# Patient Record
Sex: MALE | Race: WHITE | NOT HISPANIC OR LATINO | Employment: OTHER | URBAN - METROPOLITAN AREA
[De-identification: names, ages, dates, MRNs, and addresses within clinical notes are randomized per-mention and may not be internally consistent; named-entity substitution may affect disease eponyms.]

---

## 2020-01-27 ENCOUNTER — TELEPHONE (OUTPATIENT)
Dept: GASTROENTEROLOGY | Facility: CLINIC | Age: 75
End: 2020-01-27

## 2020-02-07 ENCOUNTER — OFFICE VISIT (OUTPATIENT)
Dept: GASTROENTEROLOGY | Facility: CLINIC | Age: 75
End: 2020-02-07
Payer: MEDICARE

## 2020-02-07 VITALS
HEIGHT: 71 IN | BODY MASS INDEX: 25.48 KG/M2 | SYSTOLIC BLOOD PRESSURE: 126 MMHG | WEIGHT: 182 LBS | HEART RATE: 85 BPM | DIASTOLIC BLOOD PRESSURE: 80 MMHG

## 2020-02-07 DIAGNOSIS — Z79.02 LONG TERM (CURRENT) USE OF ANTITHROMBOTICS/ANTIPLATELETS: ICD-10-CM

## 2020-02-07 DIAGNOSIS — Z86.010 PERSONAL HISTORY OF COLONIC POLYPS: Primary | ICD-10-CM

## 2020-02-07 DIAGNOSIS — Z86.010 HISTORY OF COLON POLYPS: Primary | ICD-10-CM

## 2020-02-07 PROCEDURE — 99213 OFFICE O/P EST LOW 20 MIN: CPT | Performed by: NURSE PRACTITIONER

## 2020-02-07 RX ORDER — NITROGLYCERIN 0.4 MG/1
TABLET SUBLINGUAL AS NEEDED
COMMUNITY
Start: 2019-07-18

## 2020-02-07 RX ORDER — AMLODIPINE BESYLATE 5 MG/1
1 TABLET ORAL DAILY
COMMUNITY
Start: 2020-01-08

## 2020-02-07 RX ORDER — SODIUM BICARBONATE 650 MG/1
4 TABLET ORAL 2 TIMES DAILY
COMMUNITY
Start: 2019-01-10

## 2020-02-07 RX ORDER — POTASSIUM CHLORIDE 750 MG/1
1 TABLET, FILM COATED, EXTENDED RELEASE ORAL 2 TIMES DAILY
COMMUNITY

## 2020-02-07 RX ORDER — MILK THISTLE 500 MG
2 CAPSULE ORAL DAILY
COMMUNITY

## 2020-02-07 RX ORDER — METOPROLOL TARTRATE 50 MG/1
1 TABLET, FILM COATED ORAL 2 TIMES DAILY
COMMUNITY
Start: 2020-01-28

## 2020-02-07 RX ORDER — ROSUVASTATIN CALCIUM 40 MG/1
1 TABLET, COATED ORAL DAILY
COMMUNITY
Start: 2020-01-17

## 2020-02-07 RX ORDER — FOLIC ACID 1 MG/1
1 TABLET ORAL DAILY
COMMUNITY

## 2020-02-07 RX ORDER — CILOSTAZOL 100 MG/1
1 TABLET ORAL 2 TIMES DAILY
COMMUNITY
Start: 2020-01-24

## 2020-02-07 RX ORDER — UREA 10 %
1 LOTION (ML) TOPICAL DAILY
COMMUNITY

## 2020-02-07 RX ORDER — ISOSORBIDE MONONITRATE 60 MG/1
1 TABLET, EXTENDED RELEASE ORAL DAILY
COMMUNITY
Start: 2020-01-28

## 2020-02-07 RX ORDER — IPRATROPIUM/ALBUTEROL SULFATE 20-100 MCG
MIST INHALER (GRAM) INHALATION
COMMUNITY
Start: 2019-12-27

## 2020-02-07 NOTE — PATIENT INSTRUCTIONS
Will arrange for colonoscopy at [facility]   We will need to obtain clearance from your PCP and or vascular surgeon for you to stop the Pletal for 7 days prior to procedure     You will need a 2 days prep     Follow up as needed

## 2020-02-07 NOTE — PROGRESS NOTES
[id number] - Outpatient Consultation  Casper Sorto 69 y o  male MRN: [id number]  Encounter: [id number]    ASSESSMENT AND PLAN:      1  Long term current use of antiplatelet  Patient has been on Pletal for many years for history of peripheral arterial disease  He previously followed with Dr Kaya Banerjee from Vascular Surgery and Dr Christina Hickman from Cardiology  He denies any chest pain, SOB  He does complain of intermittent leg cramps and leg stiffness in RLE  -arrange for colonoscopy at [facility] (as pt has defibrillator and multiple comorbidities)   -will obtain clearance from either PCP Dr Keo Tay or Vascular surgeon Dr Kaya Banerjee for pt to stop taking Pletal for 7 days prior to procedure   -Discussed with pt that if they were to stop anti-platelet therapy that there is a slight increased risk of a thromboembolic event (PE, DVT, MI, CVA)  Pt also understands that if they were to remain on anticoagulation for the procedure there is a risk for bleeding  2  Personal history of colonic polyps  Last colonoscopy on 02/13/2017  Patient did have 3 polyps that were removed, pathology revealed tubular adenoma  Also showed grade 2 internal hemorrhoids  Three year recall recall was recommended  A 2 day prep was recommended at that time due to large amount of stool in the colon      -arrange for colonoscopy as above       Followup Appointment: As needed   ______________________________________________________________________    Chief Complaint   Patient presents with    Due for colonoscopy     pt is on Pletal       HPI:   Casper Sorto is a 69y o  year old male who presents to the office today for clearance for colonoscopy  His last colonoscopy was in 2017 he is due for a recall this year  He reports that he has been feeling well and denies any upper GI complaints, fever, chills, abdominal pain, nausea, vomiting, change in bowel habits, melena, rectal bleeding    He reports that he moves his bowels every day, has formed stools once a day in the morning  He has been on Pletal for several years for peripheral arterial disease  Historical Information   Past Medical History:   Diagnosis Date    AICD (automatic cardioverter/defibrillator) present     BPH (benign prostatic hyperplasia)     CKD (chronic kidney disease)     Hyperlipidemia     [facility]    stents    [facility]per[facility] ([facility] )      History reviewed  No pertinent surgical history  Social History     Substance and Sexual Activity   Alcohol Use Yes    Alcohol/week: 2 0 standard drinks    Types: 2 Cans of beer per week    Frequency: 2-3 times a week    Drinks per session: 1 or 2     Social History     Substance and Sexual Activity   Drug Use Not on file     Social History     Tobacco Use   Smoking Status Current Every Day Smoker   Smokeless Tobacco Never Used     Family History   Problem Relation Age of Onset    Colon cancer Neg Hx     Colon polyps Neg Hx        Meds/Allergies     Current Outpatient Medications:     amLODIPine (NORVASC) 5 mg tablet    ASPIRIN 81 PO    cilostazol (PLETAL) 100 mg tablet    COMBIVENT RESPIMAT inhaler    folic acid (FOLVITE) 1 mg tablet    isosorbide mononitrate (IMDUR) 60 mg 24 hr tablet    metoprolol tartrate (LOPRESSOR) 50 mg tablet    Milk Thistle 500 MG CAPS    nitroglycerin (NITROSTAT) 0 4 mg SL tablet    potassium chloride (K-DUR) 10 mEq tablet    rosuvastatin (CRESTOR) 40 MG tablet    sodium bicarbonate 650 mg tablet    vitamin B-12 (CYANOCOBALAMIN) 100 MCG tablet    Allergies   Allergen Reactions    Shellfish-Derived Products        PHYSICAL EXAM:    Blood pressure 126/80, pulse 85, height 5' 11" (1 803 m), weight 82 6 kg (182 lb)  Body mass index is 25 38 kg/m²  General Appearance: NAD, cooperative, alert  Eyes: Anicteric, PERRLA, EOMI  ENT:  Normocephalic, atraumatic, normal mucosa      Neck:  Supple, symmetrical, trachea midline, Resp:  Clear to auscultation bilaterally; no rales, rhonchi or wheezing; respirations unlabored   CV:  S1 S2, Regular rate and rhythm; no murmur, rub, or gallop  GI:  Soft, non-tender, non-distended; normal bowel sounds; no masses, no organomegaly   Rectal: Deferred  Musculoskeletal: No cyanosis, clubbing or edema  Normal ROM  LLE-prosthetic in place (secondary to injury in [facility])    Skin:  No jaundice, rashes, or lesions    Heme/Lymph: No palpable cervical lymphadenopathy  Psych: Normal affect, good eye contact  Neuro: No gross deficits, AAOx3    Lab Results:   No results found for: WBC, HGB, HCT, MCV, PLT  No results found for: NA, K, CL, CO2, ANIONGAP, BUN, CREATININE, GLUCOSE, GLUF, CALCIUM, CORRECTEDCA, AST, ALT, ALKPHOS, PROT, BILITOT, EGFR  No results found for: IRON, TIBC, FERRITIN  No results found for: LIPASE    Radiology Results:   No results found  REVIEW OF SYSTEMS:    CONSTITUTIONAL: Denies any fever, chills, rigors, and weight loss  HEENT: No earache or tinnitus  Denies hearing loss or visual disturbances  CARDIOVASCULAR: No chest pain or palpitations  RESPIRATORY: Denies any cough, hemoptysis, shortness of breath or dyspnea on exertion  GASTROINTESTINAL: As noted in the History of Present Illness  GENITOURINARY: No problems with urination  Denies any hematuria or dysuria  NEUROLOGIC: No dizziness or vertigo, denies headaches  MUSCULOSKELETAL: positive for painful joints, leg cramps, muscle stiffness   SKIN: Denies skin rashes or itching  ENDOCRINE: Denies excessive thirst  Denies intolerance to heat or cold  PSYCHOSOCIAL: Denies depression or anxiety  Denies any recent memory loss     Hematology: positive for easy bruising

## 2020-02-12 NOTE — TELEPHONE ENCOUNTER
Pt scheduled cardiac clearance ov due to pacemaker use; pt will call back to schedule procedure appt

## 2020-02-13 ENCOUNTER — TELEPHONE (OUTPATIENT)
Dept: GASTROENTEROLOGY | Facility: CLINIC | Age: 75
End: 2020-02-13

## 2024-05-19 NOTE — TELEPHONE ENCOUNTER
Due to COVID-19 viral pneumonia and acute sepsis  Remains off oxygen.  Discontinue dexamethasone 5/23.  Continue as needed Hycodan   Pt called to cancel upcoming procedure- "rather be safe than sorry due to corona virus since he has a lower immune system and will wait to schedule in June"

## 2024-12-19 ENCOUNTER — TELEPHONE (OUTPATIENT)
Age: 79
End: 2024-12-19

## 2024-12-26 ENCOUNTER — OFFICE VISIT (OUTPATIENT)
Age: 79
End: 2024-12-26
Payer: MEDICARE

## 2024-12-26 VITALS
HEART RATE: 51 BPM | BODY MASS INDEX: 21.95 KG/M2 | TEMPERATURE: 96.8 F | SYSTOLIC BLOOD PRESSURE: 122 MMHG | OXYGEN SATURATION: 97 % | WEIGHT: 157.4 LBS | DIASTOLIC BLOOD PRESSURE: 64 MMHG

## 2024-12-26 DIAGNOSIS — R91.8 PULMONARY NODULES: Primary | ICD-10-CM

## 2024-12-26 DIAGNOSIS — J44.9 COPD, SEVERITY TO BE DETERMINED (HCC): ICD-10-CM

## 2024-12-26 DIAGNOSIS — F17.210 CIGARETTE NICOTINE DEPENDENCE WITHOUT COMPLICATION: ICD-10-CM

## 2024-12-26 DIAGNOSIS — C34.32 MALIGNANT NEOPLASM OF LOWER LOBE OF LEFT LUNG (HCC): ICD-10-CM

## 2024-12-26 PROBLEM — J41.0 SIMPLE CHRONIC BRONCHITIS (HCC): Status: ACTIVE | Noted: 2024-12-26

## 2024-12-26 PROBLEM — I50.9 HEART FAILURE, UNSPECIFIED HF CHRONICITY, UNSPECIFIED HEART FAILURE TYPE (HCC): Status: ACTIVE | Noted: 2024-12-26

## 2024-12-26 PROCEDURE — 99204 OFFICE O/P NEW MOD 45 MIN: CPT | Performed by: INTERNAL MEDICINE

## 2024-12-26 RX ORDER — SENNOSIDES 8.6 MG
650 CAPSULE ORAL
COMMUNITY

## 2024-12-26 RX ORDER — GUAIFENESIN 600 MG/1
1200 TABLET, EXTENDED RELEASE ORAL
COMMUNITY
Start: 2024-08-06

## 2024-12-26 RX ORDER — AMOXICILLIN 250 MG
1 CAPSULE ORAL
COMMUNITY

## 2024-12-26 RX ORDER — TORSEMIDE 20 MG/1
TABLET ORAL
COMMUNITY
Start: 2024-02-06 | End: 2025-02-06

## 2024-12-26 RX ORDER — CYANOCOBALAMIN/FOLIC ACID 1MG-400MCG
TABLET, SUBLINGUAL SUBLINGUAL
COMMUNITY

## 2024-12-26 NOTE — ASSESSMENT & PLAN NOTE
Patient has diagnosis of COPD.  Will obtain results from prior PFTs performed at Dr. Riley's office.  Continue Combivent 3 times daily for now.

## 2024-12-26 NOTE — PROGRESS NOTES
Pulmonary Outpatient Consultation Note   Jose R Kern 79 y.o. male MRN: [id number]  12/26/2024    Referring provider: Dottie Marie Do  [address] [place] [id number]     Assessment/Plan:      COPD, severity to be determined (HCC)  Patient has diagnosis of COPD.  Will obtain results from prior PFTs performed at Dr. Riley's office.  Continue Combivent 3 times daily for now.    Malignant neoplasm of lower lobe of left lung (HCC)  Patient has history of suspected lung cancer, completing radiation therapy to nodules on the right and left, per report.  Follows with Dr. Shelton from an oncologic standpoint.  We will plan to repeat CT in 3 months.  At patient's request, they would like to have it performed within the [facility].    Cigarette nicotine dependence without complication  Patient has been smoking 1 pack/day for the past 60+ years.  Unfortunately, has been intolerant of all smoking cessation aids    Follow-up in 6 months or sooner if needed    Visit orders:    Diagnoses and all orders for this visit:    Pulmonary nodules  -     CT chest without contrast; Future    Malignant neoplasm of lower lobe of left lung (HCC)    COPD, severity to be determined (HCC)    Cigarette nicotine dependence without complication    Other orders  -     acetaminophen (TYLENOL) 650 mg CR tablet; Take 650 mg by mouth  -     Cobalamin Combinations (B-12) 1000-400 MCG SUBL  -     Epoetin Koby-epbx (RETACRIT) 2000 UNIT/ML; INJECT 1 ML (2,000 UNITS)   PRN  -     guaiFENesin (MUCINEX) 600 mg 12 hr tablet; Take 1,200 mg by mouth  -     senna-docusate sodium (SENOKOT S) 8.6-50 mg per tablet; Take 1 tablet by mouth  -     torsemide (DEMADEX) 20 mg tablet; TAKE TWO TABLETS BY MOUTH DAILY FOR HEART  -     omeprazole (PriLOSEC) 20 mg delayed release capsule; Take 20 mg by mouth daily        History of Present Illness   HPI:  Jose R Kern is a 79 y.o. male who is here to establish care related to COPD and history of lung  cancer, treated with empiric SBRT to bilateral pulmonary nodules.  Patient previously followed with Dr. Riley from pulmonary perspective.  He first establish care with her in August 2023 when he had COVID.  He had a persistent cough, prompting bronchoscopy for airway clearance.  The cough overall improved and he will have an occasional dry cough now.  He has Combivent that he uses 3 times per day and finds it to be beneficial.  He had PFTs in the past, but is unaware of the results.  He underwent CT of the chest which showed a concerning pulmonary nodule felt to be most consistent with malignancy.  Given underlying cardiac issues and inability to safely perform a biopsy, he underwent radiation therapy to the right lung.  Follow-up imaging suggested interval development of a left-sided malignancy and he had radiation to that side as well.  Recent chest CT showed improvement in left lower lobe pulmonary nodule and stable nodules otherwise.  He denies wheezing or sputum production.  He has been smoking 1 pack/day for many years.  He has tried multiple smoking cessation aids including nicotine replacement, Chantix and Zyban.  He was intolerant of all treatment regimens.    Review of Systems otherwise negative    Historical Information   Past Medical History:   Diagnosis Date    AICD (automatic cardioverter/defibrillator) present     BPH (benign prostatic hyperplasia)     CKD (chronic kidney disease)     Hyperlipidemia     MI (myocardial infarction) (HCC) 2002    stents    PAD (peripheral artery disease) (HCC)      History reviewed. No pertinent surgical history.  Family History   Problem Relation Age of Onset    Colon cancer Neg Hx     Colon polyps Neg Hx        Social History     Tobacco Use   Smoking Status Every Day    Current packs/day: 1.00    Average packs/day: 1 pack/day for 62.0 years (62.0 ttl pk-yrs)    Types: Cigarettes    Start date: 1963   Smokeless Tobacco Never       Meds/Allergies     Current Outpatient  Medications:     acetaminophen (TYLENOL) 650 mg CR tablet, Take 650 mg by mouth, Disp: , Rfl:     amLODIPine (NORVASC) 5 mg tablet, Take 1 tablet by mouth daily, Disp: , Rfl:     ASPIRIN 81 PO, Take 2 tablets by mouth daily, Disp: , Rfl:     cilostazol (PLETAL) 100 mg tablet, Take 1 tablet by mouth 2 (two) times a day, Disp: , Rfl:     Cobalamin Combinations (B-12) 1000-400 MCG SUBL, , Disp: , Rfl:     COMBIVENT RESPIMAT inhaler, , Disp: , Rfl:     Epoetin Koby-epbx (RETACRIT) 2000 UNIT/ML, INJECT 1 ML (2,000 UNITS)   PRN, Disp: , Rfl:     guaiFENesin (MUCINEX) 600 mg 12 hr tablet, Take 1,200 mg by mouth, Disp: , Rfl:     isosorbide mononitrate (IMDUR) 60 mg 24 hr tablet, Take 1 tablet by mouth daily, Disp: , Rfl:     metoprolol tartrate (LOPRESSOR) 50 mg tablet, Take 1 tablet by mouth 2 (two) times a day, Disp: , Rfl:     nitroglycerin (NITROSTAT) 0.4 mg SL tablet, as needed, Disp: , Rfl:     omeprazole (PriLOSEC) 20 mg delayed release capsule, Take 20 mg by mouth daily, Disp: , Rfl:     potassium chloride (K-DUR) 10 mEq tablet, Take 1 tablet by mouth 2 (two) times a day, Disp: , Rfl:     rosuvastatin (CRESTOR) 40 MG tablet, Take 1 tablet by mouth daily, Disp: , Rfl:     senna-docusate sodium (SENOKOT S) 8.6-50 mg per tablet, Take 1 tablet by mouth, Disp: , Rfl:     sodium bicarbonate 650 mg tablet, Take 4 tablets by mouth 2 (two) times a day, Disp: , Rfl:     torsemide (DEMADEX) 20 mg tablet, TAKE TWO TABLETS BY MOUTH DAILY FOR HEART, Disp: , Rfl:     vitamin B-12 (CYANOCOBALAMIN) 100 MCG tablet, Take 1 tablet by mouth daily, Disp: , Rfl:     folic acid (FOLVITE) 1 mg tablet, Take 1 tablet by mouth daily, Disp: , Rfl:     Milk Thistle 500 MG CAPS, Take 2 capsules by mouth daily, Disp: , Rfl:     polyethylene glycol (COLYTE) 4000 mL solution, Take 4,000 mL by mouth once for 1 dose, Disp: 4000 mL, Rfl: 0  Allergies   Allergen Reactions    Abciximab Other (See Comments)     rec'd 4/9/01    Fish Oil - Food Allergy  Hives    Iodinated Contrast Media Other (See Comments)    Other Hives    Shellfish Allergy - Food Allergy Hives, Itching and Nausea Only    Shellfish-Derived Products - Food Allergy     Bupropion Other (See Comments) and Delirium     Other reaction(s): UNSTEADY GAIT    Other Reaction(s): Abnormal gait      Other reaction(s): UNSTEADY GAIT    Varenicline Palpitations       Vitals: Blood pressure 122/64, pulse (!) 51, temperature (!) 96.8 °F (36 °C), temperature source Tympanic Core, weight 71.4 kg (157 lb 6.4 oz), SpO2 97%., Body mass index is 21.95 kg/m². Oxygen Therapy  SpO2: 97 %  Oxygen Therapy: None (Room air)    Physical Exam   Physical Exam  Constitutional:       General: He is not in acute distress.     Appearance: Normal appearance.   HENT:      Head: Normocephalic.      Mouth/Throat:      Pharynx: No oropharyngeal exudate.   Eyes:      General: No scleral icterus.  Neck:      Vascular: No JVD.   Cardiovascular:      Rate and Rhythm: Normal rate and regular rhythm.   Pulmonary:      Breath sounds: No wheezing, rhonchi or rales.   Abdominal:      Palpations: Abdomen is soft.      Tenderness: There is no abdominal tenderness.   Musculoskeletal:         General: No deformity.      Cervical back: Neck supple.   Lymphadenopathy:      Cervical: No cervical adenopathy.   Skin:     General: Skin is warm and dry.   Neurological:      Mental Status: He is alert and oriented to person, place, and time.   Psychiatric:         Mood and Affect: Mood normal.

## 2024-12-26 NOTE — ASSESSMENT & PLAN NOTE
Patient has history of suspected lung cancer, completing radiation therapy to nodules on the right and left, per report.  Follows with Dr. Shelton from an oncologic standpoint.  We will plan to repeat CT in 3 months.  At patient's request, they would like to have it performed within the [facility] system.

## 2024-12-26 NOTE — LETTER
December 26, 2024     Lenin Shelton MD  [address] [facility] [id number]    Patient: Jose R Kern   YOB: 1945   Date of Visit: 12/26/2024       Dear Dr. Shelton:    Thank you for referring Jose R Kern to me for evaluation. Below are my notes for this consultation.    If you have questions, please do not hesitate to call me. I look forward to following your patient along with you.         Sincerely,        Dottie Rojas DO        CC: No Recipients    Dottie Rojas DO  12/26/2024 11:48 AM  Sign when Signing Visit  Pulmonary Outpatient Consultation Note   Jose R Kern 79 y.o. male MRN: [id number]  12/26/2024    Referring provider: Dottie Marie Do  [address] [place] [id number]     Assessment/Plan:      COPD, severity to be determined (HCC)  Patient has diagnosis of COPD.  Will obtain results from prior PFTs performed at Dr. Riley's office.  Continue Combivent 3 times daily for now.    Malignant neoplasm of lower lobe of left lung (HCC)  Patient has history of suspected lung cancer, completing radiation therapy to nodules on the right and left, per report.  Follows with Dr. Shelton from an oncologic standpoint.  We will plan to repeat CT in 3 months.  At patient's request, they would like to have it performed within the [facility] system.    Cigarette nicotine dependence without complication  Patient has been smoking 1 pack/day for the past 60+ years.  Unfortunately, has been intolerant of all smoking cessation aids    Follow-up in 6 months or sooner if needed    Visit orders:    Diagnoses and all orders for this visit:    Pulmonary nodules  -     CT chest without contrast; Future    Malignant neoplasm of lower lobe of left lung (HCC)    COPD, severity to be determined (HCC)    Cigarette nicotine dependence without complication    Other orders  -     acetaminophen (TYLENOL) 650 mg CR tablet; Take 650 mg by mouth  -     Cobalamin Combinations (B-12) 1000-400 MCG  SUBL  -     Epoetin Koby-epbx (RETACRIT) 2000 UNIT/ML; INJECT 1 ML (2,000 UNITS)   PRN  -     guaiFENesin (MUCINEX) 600 mg 12 hr tablet; Take 1,200 mg by mouth  -     senna-docusate sodium (SENOKOT S) 8.6-50 mg per tablet; Take 1 tablet by mouth  -     torsemide (DEMADEX) 20 mg tablet; TAKE TWO TABLETS BY MOUTH DAILY FOR HEART  -     omeprazole (PriLOSEC) 20 mg delayed release capsule; Take 20 mg by mouth daily        History of Present Illness  HPI:  Jose R Kern is a 79 y.o. male who is here to establish care related to COPD and history of lung cancer, treated with empiric SBRT to bilateral pulmonary nodules.  Patient previously followed with Dr. Riley from pulmonary perspective.  He first establish care with her in August 2023 when he had COVID.  He had a persistent cough, prompting bronchoscopy for airway clearance.  The cough overall improved and he will have an occasional dry cough now.  He has Combivent that he uses 3 times per day and finds it to be beneficial.  He had PFTs in the past, but is unaware of the results.  He underwent CT of the chest which showed a concerning pulmonary nodule felt to be most consistent with malignancy.  Given underlying cardiac issues and inability to safely perform a biopsy, he underwent radiation therapy to the right lung.  Follow-up imaging suggested interval development of a left-sided malignancy and he had radiation to that side as well.  Recent chest CT showed improvement in left lower lobe pulmonary nodule and stable nodules otherwise.  He denies wheezing or sputum production.  He has been smoking 1 pack/day for many years.  He has tried multiple smoking cessation aids including nicotine replacement, Chantix and Zyban.  He was intolerant of all treatment regimens.    Review of Systems otherwise negative    Historical Information  Past Medical History:   Diagnosis Date   • AICD (automatic cardioverter/defibrillator) present    • BPH (benign prostatic hyperplasia)    •  CKD (chronic kidney disease)    • Hyperlipidemia    • MI (myocardial infarction) (HCC) 2002    stents   • PAD (peripheral artery disease) (HCC)      History reviewed. No pertinent surgical history.  Family History   Problem Relation Age of Onset   • Colon cancer Neg Hx    • Colon polyps Neg Hx        Social History     Tobacco Use   Smoking Status Every Day   • Current packs/day: 1.00   • Average packs/day: 1 pack/day for 62.0 years (62.0 ttl pk-yrs)   • Types: Cigarettes   • Start date: 1963   Smokeless Tobacco Never       Meds/Allergies    Current Outpatient Medications:   •  acetaminophen (TYLENOL) 650 mg CR tablet, Take 650 mg by mouth, Disp: , Rfl:   •  amLODIPine (NORVASC) 5 mg tablet, Take 1 tablet by mouth daily, Disp: , Rfl:   •  ASPIRIN 81 PO, Take 2 tablets by mouth daily, Disp: , Rfl:   •  cilostazol (PLETAL) 100 mg tablet, Take 1 tablet by mouth 2 (two) times a day, Disp: , Rfl:   •  Cobalamin Combinations (B-12) 1000-400 MCG SUBL, , Disp: , Rfl:   •  COMBIVENT RESPIMAT inhaler, , Disp: , Rfl:   •  Epoetin Koby-epbx (RETACRIT) 2000 UNIT/ML, INJECT 1 ML (2,000 UNITS)   PRN, Disp: , Rfl:   •  guaiFENesin (MUCINEX) 600 mg 12 hr tablet, Take 1,200 mg by mouth, Disp: , Rfl:   •  isosorbide mononitrate (IMDUR) 60 mg 24 hr tablet, Take 1 tablet by mouth daily, Disp: , Rfl:   •  metoprolol tartrate (LOPRESSOR) 50 mg tablet, Take 1 tablet by mouth 2 (two) times a day, Disp: , Rfl:   •  nitroglycerin (NITROSTAT) 0.4 mg SL tablet, as needed, Disp: , Rfl:   •  omeprazole (PriLOSEC) 20 mg delayed release capsule, Take 20 mg by mouth daily, Disp: , Rfl:   •  potassium chloride (K-DUR) 10 mEq tablet, Take 1 tablet by mouth 2 (two) times a day, Disp: , Rfl:   •  rosuvastatin (CRESTOR) 40 MG tablet, Take 1 tablet by mouth daily, Disp: , Rfl:   •  senna-docusate sodium (SENOKOT S) 8.6-50 mg per tablet, Take 1 tablet by mouth, Disp: , Rfl:   •  sodium bicarbonate 650 mg tablet, Take 4 tablets by mouth 2 (two) times a day,  Disp: , Rfl:   •  torsemide (DEMADEX) 20 mg tablet, TAKE TWO TABLETS BY MOUTH DAILY FOR HEART, Disp: , Rfl:   •  vitamin B-12 (CYANOCOBALAMIN) 100 MCG tablet, Take 1 tablet by mouth daily, Disp: , Rfl:   •  folic acid (FOLVITE) 1 mg tablet, Take 1 tablet by mouth daily, Disp: , Rfl:   •  Milk Thistle 500 MG CAPS, Take 2 capsules by mouth daily, Disp: , Rfl:   •  polyethylene glycol (COLYTE) 4000 mL solution, Take 4,000 mL by mouth once for 1 dose, Disp: 4000 mL, Rfl: 0  Allergies   Allergen Reactions   • Abciximab Other (See Comments)     rec'd 4/9/01   • Fish Oil - Food Allergy Hives   • Iodinated Contrast Media Other (See Comments)   • Other Hives   • Shellfish Allergy - Food Allergy Hives, Itching and Nausea Only   • Shellfish-Derived Products - Food Allergy    • Bupropion Other (See Comments) and Delirium     Other reaction(s): UNSTEADY GAIT    Other Reaction(s): Abnormal gait      Other reaction(s): UNSTEADY GAIT   • Varenicline Palpitations       Vitals: Blood pressure 122/64, pulse (!) 51, temperature (!) 96.8 °F (36 °C), temperature source Tympanic Core, weight 71.4 kg (157 lb 6.4 oz), SpO2 97%., Body mass index is 21.95 kg/m². Oxygen Therapy  SpO2: 97 %  Oxygen Therapy: None (Room air)    Physical Exam   Physical Exam  Constitutional:       General: He is not in acute distress.     Appearance: Normal appearance.   HENT:      Head: Normocephalic.      Mouth/Throat:      Pharynx: No oropharyngeal exudate.   Eyes:      General: No scleral icterus.  Neck:      Vascular: No JVD.   Cardiovascular:      Rate and Rhythm: Normal rate and regular rhythm.   Pulmonary:      Breath sounds: No wheezing, rhonchi or rales.   Abdominal:      Palpations: Abdomen is soft.      Tenderness: There is no abdominal tenderness.   Musculoskeletal:         General: No deformity.      Cervical back: Neck supple.   Lymphadenopathy:      Cervical: No cervical adenopathy.   Skin:     General: Skin is warm and dry.   Neurological:       Mental Status: He is alert and oriented to person, place, and time.   Psychiatric:         Mood and Affect: Mood normal.

## 2024-12-26 NOTE — ASSESSMENT & PLAN NOTE
Patient has been smoking 1 pack/day for the past 60+ years.  Unfortunately, has been intolerant of all smoking cessation aids

## 2025-03-27 ENCOUNTER — HOSPITAL ENCOUNTER (OUTPATIENT)
Dept: CT IMAGING | Facility: HOSPITAL | Age: 80
Discharge: HOME | End: 2025-03-27
Attending: INTERNAL MEDICINE
Payer: MEDICARE

## 2025-03-27 DIAGNOSIS — R91.8 PULMONARY NODULES: ICD-10-CM

## 2025-03-27 PROCEDURE — 71250 CT THORAX DX C-: CPT

## 2025-04-11 ENCOUNTER — TELEPHONE (OUTPATIENT)
Age: 80
End: 2025-04-11

## 2025-04-11 NOTE — TELEPHONE ENCOUNTER
Patient's spouse is calling to request the patient's office visit notes (12/26/2024) and CT chest study results from 3/27 to be printed out for her. She does not need a disc, just the written results. She is going to come into the office on 4/15 around 10 am to pick them up. She is need of this due [facility]      Please advise.

## 2025-06-03 ENCOUNTER — TELEPHONE (OUTPATIENT)
Age: 80
End: 2025-06-03

## 2025-07-31 ENCOUNTER — OFFICE VISIT (OUTPATIENT)
Age: 80
End: 2025-07-31
Payer: MEDICARE

## 2025-07-31 VITALS
HEIGHT: 70 IN | SYSTOLIC BLOOD PRESSURE: 126 MMHG | BODY MASS INDEX: 21.05 KG/M2 | DIASTOLIC BLOOD PRESSURE: 74 MMHG | HEART RATE: 49 BPM | TEMPERATURE: 97.4 F | OXYGEN SATURATION: 97 % | WEIGHT: 147 LBS

## 2025-07-31 DIAGNOSIS — J96.01 ACUTE HYPOXEMIC RESPIRATORY FAILURE (HCC): ICD-10-CM

## 2025-07-31 DIAGNOSIS — J44.9 COPD, MODERATE (HCC): ICD-10-CM

## 2025-07-31 DIAGNOSIS — I50.9 HEART FAILURE, UNSPECIFIED HF CHRONICITY, UNSPECIFIED HEART FAILURE TYPE (HCC): ICD-10-CM

## 2025-07-31 DIAGNOSIS — R91.8 PULMONARY NODULES: Primary | ICD-10-CM

## 2025-07-31 DIAGNOSIS — Z85.118 HISTORY OF LUNG CANCER: ICD-10-CM

## 2025-07-31 DIAGNOSIS — C34.32 MALIGNANT NEOPLASM OF LOWER LOBE OF LEFT LUNG (HCC): ICD-10-CM

## 2025-07-31 PROCEDURE — 99214 OFFICE O/P EST MOD 30 MIN: CPT | Performed by: INTERNAL MEDICINE

## 2025-07-31 PROCEDURE — G2211 COMPLEX E/M VISIT ADD ON: HCPCS | Performed by: INTERNAL MEDICINE

## 2025-07-31 RX ORDER — HYDRALAZINE HYDROCHLORIDE 25 MG/1
25 TABLET, FILM COATED ORAL 2 TIMES DAILY
COMMUNITY

## 2025-07-31 RX ORDER — AMIODARONE HYDROCHLORIDE 200 MG/1
200 TABLET ORAL DAILY
COMMUNITY

## 2025-08-19 ENCOUNTER — APPOINTMENT (OUTPATIENT)
Age: 80
End: 2025-08-19
Payer: MEDICARE

## 2025-08-19 DIAGNOSIS — I25.119 ATHEROSCLEROSIS OF NATIVE CORONARY ARTERY WITH ANGINA PECTORIS, UNSPECIFIED WHETHER NATIVE OR TRANSPLANTED HEART (HCC): ICD-10-CM

## 2025-08-19 DIAGNOSIS — E78.5 HYPERLIPIDEMIA, UNSPECIFIED HYPERLIPIDEMIA TYPE: ICD-10-CM

## 2025-08-19 LAB
ALBUMIN SERPL BCG-MCNC: 3.8 G/DL (ref 3.5–5)
ANION GAP SERPL CALCULATED.3IONS-SCNC: 11 MMOL/L (ref 4–13)
BASOPHILS # BLD AUTO: 0.06 THOUSANDS/ÂΜL (ref 0–0.1)
BASOPHILS NFR BLD AUTO: 1 % (ref 0–1)
BUN SERPL-MCNC: 73 MG/DL (ref 5–25)
CALCIUM SERPL-MCNC: 8.6 MG/DL (ref 8.4–10.2)
CHLORIDE SERPL-SCNC: 110 MMOL/L (ref 96–108)
CHOLEST SERPL-MCNC: 81 MG/DL (ref ?–200)
CO2 SERPL-SCNC: 23 MMOL/L (ref 21–32)
CREAT SERPL-MCNC: 4.31 MG/DL (ref 0.6–1.3)
EOSINOPHIL # BLD AUTO: 0.14 THOUSAND/ÂΜL (ref 0–0.61)
EOSINOPHIL NFR BLD AUTO: 1 % (ref 0–6)
ERYTHROCYTE [DISTWIDTH] IN BLOOD BY AUTOMATED COUNT: 14.5 % (ref 11.6–15.1)
GFR SERPL CREATININE-BSD FRML MDRD: 12 ML/MIN/1.73SQ M
GLUCOSE P FAST SERPL-MCNC: 80 MG/DL (ref 65–99)
HCT VFR BLD AUTO: 34.6 % (ref 36.5–49.3)
HDLC SERPL-MCNC: 38 MG/DL
HGB BLD-MCNC: 11.1 G/DL (ref 12–17)
IMM GRANULOCYTES # BLD AUTO: 0.05 THOUSAND/UL (ref 0–0.2)
IMM GRANULOCYTES NFR BLD AUTO: 0 % (ref 0–2)
LDLC SERPL CALC-MCNC: 33 MG/DL (ref 0–100)
LYMPHOCYTES # BLD AUTO: 1.22 THOUSANDS/ÂΜL (ref 0.6–4.47)
LYMPHOCYTES NFR BLD AUTO: 10 % (ref 14–44)
MCH RBC QN AUTO: 34.5 PG (ref 26.8–34.3)
MCHC RBC AUTO-ENTMCNC: 32.1 G/DL (ref 31.4–37.4)
MCV RBC AUTO: 108 FL (ref 82–98)
MONOCYTES # BLD AUTO: 1.14 THOUSAND/ÂΜL (ref 0.17–1.22)
MONOCYTES NFR BLD AUTO: 10 % (ref 4–12)
NEUTROPHILS # BLD AUTO: 9.22 THOUSANDS/ÂΜL (ref 1.85–7.62)
NEUTS SEG NFR BLD AUTO: 78 % (ref 43–75)
NRBC BLD AUTO-RTO: 0 /100 WBCS
PHOSPHATE SERPL-MCNC: 3.9 MG/DL (ref 2.3–4.1)
PLATELET # BLD AUTO: 184 THOUSANDS/UL (ref 149–390)
PMV BLD AUTO: 12 FL (ref 8.9–12.7)
POTASSIUM SERPL-SCNC: 4.3 MMOL/L (ref 3.5–5.3)
PTH-INTACT SERPL-MCNC: 81.2 PG/ML (ref 12–88)
RBC # BLD AUTO: 3.22 MILLION/UL (ref 3.88–5.62)
SODIUM SERPL-SCNC: 144 MMOL/L (ref 135–147)
TRIGL SERPL-MCNC: 50 MG/DL (ref ?–150)
WBC # BLD AUTO: 11.83 THOUSAND/UL (ref 4.31–10.16)

## 2025-08-19 PROCEDURE — 36415 COLL VENOUS BLD VENIPUNCTURE: CPT

## 2025-08-19 PROCEDURE — 80061 LIPID PANEL: CPT

## 2025-08-19 PROCEDURE — 85025 COMPLETE CBC W/AUTO DIFF WBC: CPT

## 2025-08-19 PROCEDURE — 83970 ASSAY OF PARATHORMONE: CPT

## 2025-08-19 PROCEDURE — 80069 RENAL FUNCTION PANEL: CPT

## 2025-08-20 DIAGNOSIS — I47.20 VENTRICULAR TACHYCARDIA, UNSPECIFIED (HCC): ICD-10-CM
